# Patient Record
Sex: FEMALE | URBAN - METROPOLITAN AREA
[De-identification: names, ages, dates, MRNs, and addresses within clinical notes are randomized per-mention and may not be internally consistent; named-entity substitution may affect disease eponyms.]

---

## 2024-10-25 ENCOUNTER — TELEMEDICINE (OUTPATIENT)
Dept: NEUROLOGY | Facility: HOSPITAL | Age: 59
End: 2024-10-25

## 2024-10-25 NOTE — TELEMEDICINE CONSULT
The patient is a 57 yo woman. Neurology consulted for AMS. She was admitted a few days ago due to R foot pain. She was diagnosed with gangrene and is now sp debridement and is currently on IV antibiotics. She was also noticed to have an NATE. She has been in significant pain so has received pain medications for it. 2 days ago she received morphine which improved her pain but some drowsiness was noticed afterwards. Morphine was stopped and her mentation improved. Sometime between yesterday and today, there was a somewhat abrupt change in mental status again. She went from conversant to just mumbling and barely any words. An MRI was obtained which didn't show any abnormalities. During my assessment today, she seemed drowsy and would wake up to voice but not really follow commands or stay awake.     Case most consistent with toxic metabolic encephalopathy in the setting of active infection and NATE + pain medication. Main recommendation at this time is to continue treatment of infection, hydration for the kidneys, and avoiding sedating medication. If by tomorrow there is no improvement, we can consider EEG. Case discussed with primary team